# Patient Record
Sex: FEMALE | Race: WHITE | Employment: FULL TIME | URBAN - METROPOLITAN AREA
[De-identification: names, ages, dates, MRNs, and addresses within clinical notes are randomized per-mention and may not be internally consistent; named-entity substitution may affect disease eponyms.]

---

## 2016-08-12 LAB
HBV SURFACE AG SERPL QL IA: NORMAL
RUBELLA ANTIBODY IGG QUANTITATIVE: NORMAL IU/ML

## 2017-01-24 ENCOUNTER — HOSPITAL ENCOUNTER (OUTPATIENT)
Facility: CLINIC | Age: 32
End: 2017-01-24
Attending: OBSTETRICS & GYNECOLOGY | Admitting: OBSTETRICS & GYNECOLOGY
Payer: COMMERCIAL

## 2017-02-01 LAB — GROUP B STREP PCR: NORMAL

## 2017-03-07 ENCOUNTER — HOSPITAL ENCOUNTER (OUTPATIENT)
Facility: CLINIC | Age: 32
Discharge: HOME | End: 2017-03-07
Attending: OBSTETRICS & GYNECOLOGY | Admitting: OBSTETRICS & GYNECOLOGY
Payer: COMMERCIAL

## 2017-03-07 ENCOUNTER — ANESTHESIA (OUTPATIENT)
Dept: OBGYN | Facility: CLINIC | Age: 32
End: 2017-03-07
Payer: COMMERCIAL

## 2017-03-07 ENCOUNTER — ANESTHESIA EVENT (OUTPATIENT)
Dept: OBGYN | Facility: CLINIC | Age: 32
End: 2017-03-07
Payer: COMMERCIAL

## 2017-03-07 ENCOUNTER — HOSPITAL ENCOUNTER (INPATIENT)
Facility: CLINIC | Age: 32
LOS: 3 days | Discharge: HOME HEALTH | End: 2017-03-10
Attending: OBSTETRICS & GYNECOLOGY | Admitting: OBSTETRICS & GYNECOLOGY
Payer: COMMERCIAL

## 2017-03-07 VITALS
TEMPERATURE: 100 F | RESPIRATION RATE: 18 BRPM | DIASTOLIC BLOOD PRESSURE: 77 MMHG | BODY MASS INDEX: 25.83 KG/M2 | HEIGHT: 65 IN | SYSTOLIC BLOOD PRESSURE: 127 MMHG | WEIGHT: 155 LBS

## 2017-03-07 LAB
ABO + RH BLD: NORMAL
ABO + RH BLD: NORMAL
BASOPHILS # BLD AUTO: 0 10E9/L (ref 0–0.2)
BASOPHILS NFR BLD AUTO: 0.3 %
CRYSTALS AMN MICRO: NORMAL
DIFFERENTIAL METHOD BLD: ABNORMAL
EOSINOPHIL # BLD AUTO: 0 10E9/L (ref 0–0.7)
EOSINOPHIL NFR BLD AUTO: 0.3 %
ERYTHROCYTE [DISTWIDTH] IN BLOOD BY AUTOMATED COUNT: 13.3 % (ref 10–15)
HCT VFR BLD AUTO: 38.1 % (ref 35–47)
HGB BLD-MCNC: 13.2 G/DL (ref 11.7–15.7)
IMM GRANULOCYTES # BLD: 0.1 10E9/L (ref 0–0.4)
IMM GRANULOCYTES NFR BLD: 0.6 %
LYMPHOCYTES # BLD AUTO: 2.1 10E9/L (ref 0.8–5.3)
LYMPHOCYTES NFR BLD AUTO: 15 %
MCH RBC QN AUTO: 31.6 PG (ref 26.5–33)
MCHC RBC AUTO-ENTMCNC: 34.6 G/DL (ref 31.5–36.5)
MCV RBC AUTO: 91 FL (ref 78–100)
MONOCYTES # BLD AUTO: 1 10E9/L (ref 0–1.3)
MONOCYTES NFR BLD AUTO: 6.8 %
NEUTROPHILS # BLD AUTO: 10.9 10E9/L (ref 1.6–8.3)
NEUTROPHILS NFR BLD AUTO: 77 %
NRBC # BLD AUTO: 0 10*3/UL
NRBC BLD AUTO-RTO: 0 /100
PLATELET # BLD AUTO: 211 10E9/L (ref 150–450)
RBC # BLD AUTO: 4.18 10E12/L (ref 3.8–5.2)
SPECIMEN EXP DATE BLD: NORMAL
WBC # BLD AUTO: 14.1 10E9/L (ref 4–11)

## 2017-03-07 PROCEDURE — 99214 OFFICE O/P EST MOD 30 MIN: CPT | Mod: 25

## 2017-03-07 PROCEDURE — 36415 COLL VENOUS BLD VENIPUNCTURE: CPT | Performed by: OBSTETRICS & GYNECOLOGY

## 2017-03-07 PROCEDURE — 25000125 ZZHC RX 250: Performed by: OBSTETRICS & GYNECOLOGY

## 2017-03-07 PROCEDURE — 25000125 ZZHC RX 250: Performed by: ANESTHESIOLOGY

## 2017-03-07 PROCEDURE — 99213 OFFICE O/P EST LOW 20 MIN: CPT | Mod: 25

## 2017-03-07 PROCEDURE — 86780 TREPONEMA PALLIDUM: CPT | Performed by: OBSTETRICS & GYNECOLOGY

## 2017-03-07 PROCEDURE — 25000128 H RX IP 250 OP 636: Performed by: ANESTHESIOLOGY

## 2017-03-07 PROCEDURE — 12000029 ZZH R&B OB INTERMEDIATE

## 2017-03-07 PROCEDURE — 86900 BLOOD TYPING SEROLOGIC ABO: CPT | Performed by: OBSTETRICS & GYNECOLOGY

## 2017-03-07 PROCEDURE — 85025 COMPLETE CBC W/AUTO DIFF WBC: CPT | Performed by: OBSTETRICS & GYNECOLOGY

## 2017-03-07 PROCEDURE — 25800025 ZZH RX 258: Performed by: OBSTETRICS & GYNECOLOGY

## 2017-03-07 PROCEDURE — 3E0R3CZ INTRODUCTION OF REGIONAL ANESTHETIC INTO SPINAL CANAL, PERCUTANEOUS APPROACH: ICD-10-PCS | Performed by: ANESTHESIOLOGY

## 2017-03-07 PROCEDURE — 00HU33Z INSERTION OF INFUSION DEVICE INTO SPINAL CANAL, PERCUTANEOUS APPROACH: ICD-10-PCS | Performed by: ANESTHESIOLOGY

## 2017-03-07 PROCEDURE — 59025 FETAL NON-STRESS TEST: CPT

## 2017-03-07 PROCEDURE — 25000128 H RX IP 250 OP 636: Performed by: OBSTETRICS & GYNECOLOGY

## 2017-03-07 PROCEDURE — 37000011 ZZH ANESTHESIA WARD SERVICE

## 2017-03-07 PROCEDURE — 86901 BLOOD TYPING SEROLOGIC RH(D): CPT | Performed by: OBSTETRICS & GYNECOLOGY

## 2017-03-07 PROCEDURE — 10907ZC DRAINAGE OF AMNIOTIC FLUID, THERAPEUTIC FROM PRODUCTS OF CONCEPTION, VIA NATURAL OR ARTIFICIAL OPENING: ICD-10-PCS | Performed by: OBSTETRICS & GYNECOLOGY

## 2017-03-07 RX ORDER — CARBOPROST TROMETHAMINE 250 UG/ML
250 INJECTION, SOLUTION INTRAMUSCULAR
Status: DISCONTINUED | OUTPATIENT
Start: 2017-03-07 | End: 2017-03-08

## 2017-03-07 RX ORDER — OXYTOCIN/0.9 % SODIUM CHLORIDE 30/500 ML
100-340 PLASTIC BAG, INJECTION (ML) INTRAVENOUS CONTINUOUS PRN
Status: DISCONTINUED | OUTPATIENT
Start: 2017-03-07 | End: 2017-03-08

## 2017-03-07 RX ORDER — EPHEDRINE SULFATE 50 MG/ML
5 INJECTION, SOLUTION INTRAMUSCULAR; INTRAVENOUS; SUBCUTANEOUS
Status: DISCONTINUED | OUTPATIENT
Start: 2017-03-07 | End: 2017-03-08

## 2017-03-07 RX ORDER — IBUPROFEN 800 MG/1
800 TABLET, FILM COATED ORAL
Status: COMPLETED | OUTPATIENT
Start: 2017-03-07 | End: 2017-03-08

## 2017-03-07 RX ORDER — NALOXONE HYDROCHLORIDE 0.4 MG/ML
.1-.4 INJECTION, SOLUTION INTRAMUSCULAR; INTRAVENOUS; SUBCUTANEOUS
Status: DISCONTINUED | OUTPATIENT
Start: 2017-03-07 | End: 2017-03-08

## 2017-03-07 RX ORDER — SODIUM CHLORIDE, SODIUM LACTATE, POTASSIUM CHLORIDE, CALCIUM CHLORIDE 600; 310; 30; 20 MG/100ML; MG/100ML; MG/100ML; MG/100ML
INJECTION, SOLUTION INTRAVENOUS CONTINUOUS
Status: DISCONTINUED | OUTPATIENT
Start: 2017-03-07 | End: 2017-03-08

## 2017-03-07 RX ORDER — OXYTOCIN/0.9 % SODIUM CHLORIDE 30/500 ML
1-24 PLASTIC BAG, INJECTION (ML) INTRAVENOUS CONTINUOUS
Status: DISCONTINUED | OUTPATIENT
Start: 2017-03-07 | End: 2017-03-08

## 2017-03-07 RX ORDER — ACETAMINOPHEN 325 MG/1
650 TABLET ORAL EVERY 4 HOURS PRN
Status: DISCONTINUED | OUTPATIENT
Start: 2017-03-07 | End: 2017-03-08

## 2017-03-07 RX ORDER — LIDOCAINE HYDROCHLORIDE AND EPINEPHRINE 15; 5 MG/ML; UG/ML
3 INJECTION, SOLUTION EPIDURAL
Status: COMPLETED | OUTPATIENT
Start: 2017-03-07 | End: 2017-03-07

## 2017-03-07 RX ORDER — NALBUPHINE HYDROCHLORIDE 10 MG/ML
2.5-5 INJECTION, SOLUTION INTRAMUSCULAR; INTRAVENOUS; SUBCUTANEOUS EVERY 6 HOURS PRN
Status: DISCONTINUED | OUTPATIENT
Start: 2017-03-07 | End: 2017-03-08

## 2017-03-07 RX ORDER — ROPIVACAINE HYDROCHLORIDE 2 MG/ML
10 INJECTION, SOLUTION EPIDURAL; INFILTRATION; PERINEURAL ONCE
Status: COMPLETED | OUTPATIENT
Start: 2017-03-07 | End: 2017-03-07

## 2017-03-07 RX ORDER — ONDANSETRON 2 MG/ML
4 INJECTION INTRAMUSCULAR; INTRAVENOUS EVERY 6 HOURS PRN
Status: DISCONTINUED | OUTPATIENT
Start: 2017-03-07 | End: 2017-03-08

## 2017-03-07 RX ORDER — FENTANYL CITRATE 50 UG/ML
50-100 INJECTION, SOLUTION INTRAMUSCULAR; INTRAVENOUS
Status: DISCONTINUED | OUTPATIENT
Start: 2017-03-07 | End: 2017-03-08

## 2017-03-07 RX ORDER — OXYCODONE AND ACETAMINOPHEN 5; 325 MG/1; MG/1
1 TABLET ORAL
Status: DISCONTINUED | OUTPATIENT
Start: 2017-03-07 | End: 2017-03-08

## 2017-03-07 RX ORDER — LIDOCAINE 40 MG/G
CREAM TOPICAL
Status: DISCONTINUED | OUTPATIENT
Start: 2017-03-07 | End: 2017-03-08

## 2017-03-07 RX ORDER — OXYTOCIN 10 [USP'U]/ML
10 INJECTION, SOLUTION INTRAMUSCULAR; INTRAVENOUS
Status: DISCONTINUED | OUTPATIENT
Start: 2017-03-07 | End: 2017-03-08

## 2017-03-07 RX ORDER — METHYLERGONOVINE MALEATE 0.2 MG/ML
200 INJECTION INTRAVENOUS
Status: DISCONTINUED | OUTPATIENT
Start: 2017-03-07 | End: 2017-03-08

## 2017-03-07 RX ADMIN — Medication 1 MILLI-UNITS/MIN: at 20:57

## 2017-03-07 RX ADMIN — Medication 12 ML/HR: at 20:14

## 2017-03-07 RX ADMIN — SODIUM CHLORIDE, POTASSIUM CHLORIDE, SODIUM LACTATE AND CALCIUM CHLORIDE: 600; 310; 30; 20 INJECTION, SOLUTION INTRAVENOUS at 20:14

## 2017-03-07 RX ADMIN — ONDANSETRON 4 MG: 2 INJECTION INTRAMUSCULAR; INTRAVENOUS at 23:09

## 2017-03-07 RX ADMIN — SODIUM CHLORIDE, POTASSIUM CHLORIDE, SODIUM LACTATE AND CALCIUM CHLORIDE 1000 ML: 600; 310; 30; 20 INJECTION, SOLUTION INTRAVENOUS at 19:22

## 2017-03-07 RX ADMIN — ROPIVACAINE HYDROCHLORIDE 15 ML: 2 INJECTION, SOLUTION EPIDURAL; INFILTRATION at 20:08

## 2017-03-07 RX ADMIN — LIDOCAINE HYDROCHLORIDE AND EPINEPHRINE 3 ML: 15; 5 INJECTION, SOLUTION EPIDURAL at 20:08

## 2017-03-07 RX ADMIN — SODIUM CHLORIDE, POTASSIUM CHLORIDE, SODIUM LACTATE AND CALCIUM CHLORIDE 500 ML: 600; 310; 30; 20 INJECTION, SOLUTION INTRAVENOUS at 17:24

## 2017-03-07 NOTE — IP AVS SNAPSHOT
MRN:[id number]                      After Visit Summary   3/7/2017    Meli Pringle    MRN: [id number]           Thank you!     Thank you for choosing [facility] for your care. Our goal is always to provide you with excellent care. Hearing back from our patients is one way we can continue to improve our services. Please take a few minutes to complete the written survey that you may receive in the mail after you visit with us. Thank you!        Patient Information     Date Of Birth          1985        About your hospital stay     You were admitted on:  March 7, 2017 You last received care in the:  [facility]    You were discharged on:  March 7, 2017       Who to Call     For medical emergencies, please call 911.  For non-urgent questions about your medical care, please call your primary care provider or clinic, [phone]          Attending Provider     Provider Carlene Nelson MD OB/Gyn       Primary Care Provider Office Phone # Fax #    Carlene Morrison [phone]       [facility] OBGYN CONSULT [address] [place] [id number]        Further instructions from your care team       Discharge Instruction for Undelivered Patients      You were seen for: Labor Assessment  We Consulted: Dr. CONSUELO Morrison  You had (Test or Medicine):fetal monitoring, fern test     Diet:   Drink 8 to 12 glasses of liquids (milk, juice, water) every day.  You may eat meals and snacks.     Activity:  Call your doctor or nurse midwife if your baby is moving less than usual.     Call your provider if you notice:  Swelling in your face or increased swelling in your hands or legs.  Headaches that are not relieved by Tylenol (acetaminophen).  Changes in your vision (blurring: seeing spots or stars.)  Nausea (sick to your stomach) and vomiting (throwing up).   Weight gain of 5 pounds or more per week.  Heartburn that doesn't go away.  Signs  "of bladder infection: pain when you urinate (use the toilet), need to go more often and more urgently.  The bag of leon (rupture of membranes) breaks, or you notice leaking in your underwear.  Bright red blood in your underwear.  Abdominal (lower belly) or stomach pain.  For first baby: Contractions (tightening) less than 5 minutes apart for one hour or more.  Second (plus) baby: Contractions (tightening) less than 10 minutes apart and getting stronger.  *If less than 34 weeks: Contractions (tightenings) more than 6 times in one hour.  Increase or change in vaginal discharge (note the color and amount)  Other: See MD at clinic today.    Follow-up:  As scheduled in the clinic          Pending Results     No orders found from 3/5/2017 to 3/8/2017.            Admission Information     Date & Time Provider Department Dept. Phone    3/7/2017 Carlene Morrison MD [facility] [phone]      Your Vitals Were     Blood Pressure Temperature Respirations Height Weight Last Period    127/77 100  F (37.8  C) (Temporal) 18 1.651 m (5' 5\") 70.3 kg (155 lb) 2016    BMI (Body Mass Index)                   25.79 kg/m2           [employer] Information     [employer] lets you send messages to your doctor, view your test results, renew your prescriptions, schedule appointments and more. To sign up, go to [email or web address] . Click on \"Log in\" on the left side of the screen, which will take you to the Welcome page. Then click on \"Sign up Now\" on the right side of the page.     You will be asked to enter the access code listed below, as well as some personal information. Please follow the directions to create your username and password.     Your access code is: [email or web address]  Expires: 2017  3:06 AM     Your access code will  in 90 days. If you need help or a new code, please call your [facility] clinic or [phone].        Care EveryWhere ID     This is your Care EveryWhere ID. This could be " used by other organizations to access your [facility] medical records  [id number]           Review of your medicines      UNREVIEWED medicines. Ask your doctor about these medicines        Dose / Directions    DHA PO        Dose:  1 tablet   Take 1 tablet by mouth daily   Refills:  0       prenatal multivitamin  plus iron 27-0.8 MG Tabs per tablet        Dose:  1 tablet   Take 1 tablet by mouth daily   Refills:  0       VITAMIN D (CHOLECALCIFEROL) PO        Dose:  1000 Units   Take 1,000 Units by mouth daily   Refills:  0       ZYRTEC 10 MG tablet   Generic drug:  cetirizine        Dose:  10 mg   Take 1 tablet by mouth daily.   Quantity:  30 tablet   Refills:  5                Protect others around you: Learn how to safely use, store and throw away your medicines at www.disposemymeds.org.             Medication List: This is a list of all your medications and when to take them. Check marks below indicate your daily home schedule. Keep this list as a reference.      Medications           Morning Afternoon Evening Bedtime As Needed    DHA PO   Take 1 tablet by mouth daily                                prenatal multivitamin  plus iron 27-0.8 MG Tabs per tablet   Take 1 tablet by mouth daily                                VITAMIN D (CHOLECALCIFEROL) PO   Take 1,000 Units by mouth daily                                ZYRTEC 10 MG tablet   Take 1 tablet by mouth daily.   Generic drug:  cetirizine

## 2017-03-07 NOTE — IP AVS SNAPSHOT
MRN:[id number]                      After Visit Summary   3/7/2017    Meli Pringle    MRN: [id number]           Thank you!     Thank you for choosing [facility] for your care. Our goal is always to provide you with excellent care. Hearing back from our patients is one way we can continue to improve our services. Please take a few minutes to complete the written survey that you may receive in the mail after you visit with us. Thank you!        Patient Information     Date Of Birth          1985        About your hospital stay     You were admitted on:  March 7, 2017 You last received care in the:  [address]    You were discharged on:  March 10, 2017       Who to Call     For medical emergencies, please call 911.  For non-urgent questions about your medical care, please call your primary care provider or clinic, [phone]          Attending Provider     Provider Specialty    Carlene Morrison MD OB/Gyn    [place], Viki Horton MD OB/Gyn       Primary Care Provider Office Phone # Fax #    Carlene Morrison [phone]       [facility] OBGYN CONSULT [address] [address]        After Care Instructions     Activity       Review discharge instructions            Diet       Resume previous diet            Discharge Instructions - Postpartum visit       Schedule postpartum visit with your provider and return to clinic in 6 weeks.                  Further instructions from your care team       Postpartum Vaginal Delivery Instructions    Activity       Ask family and friends for help when you need it.    Do not place anything in your vagina for 6 weeks.    You are not restricted on other activities, but take it easy for a few weeks to allow your body to recover from delivery.  You are able to do any activities you feel up to that point.    No driving until you have stopped taking your pain medications  (usually two weeks after delivery).     Call your health care provider if you have any of these symptoms:       Increased pain, swelling, redness, or fluid around your stiches from an episiotomy or perineal tear.    A fever above 100.4 F (38 C) with or without chills when placing a thermometer under your tongue.    You soak a sanitary pad with blood within 1 hour, or you see blood clots larger than a golf ball.    Bleeding that lasts more than 6 weeks.    Vaginal discharge that smells bad.    Severe pain, cramping or tenderness in your lower belly area.    A need to urinate more frequently (use the toilet more often), more urgently (use the toilet very quickly), or it burns when you urinate.    Nausea and vomiting.    Redness, swelling or pain around a vein in your leg.    Problems breastfeeding or a red or painful area on your breast.    Chest pain and cough or are gasping for air.    Problems coping with sadness, anxiety, or depression.  If you have any concerns about hurting yourself or the baby, call your provider immediately.     You have questions or concerns after you return home.     Keep your hands clean:  Always wash your hands before touching your perineal area and stitches.  This helps reduce your risk of infection.  If your hands aren't dirty, you may use an alcohol hand-rub to clean your hands. Keep your nails clean and short.        Pending Results     No orders found from 3/5/2017 to 3/8/2017.            Statement of Approval     Ordered          03/10/17 0842  I have reviewed and agree with all the recommendations and orders detailed in this document.  EFFECTIVE NOW     Approved and electronically signed by:  Jaxson Granados MD             Admission Information     Date & Time Provider Department Dept. Phone    3/7/2017 Viki Newman MD [address] [phone]      Your Vitals Were     Blood Pressure Pulse Temperature Respirations Height Weight    114/79  "91 98.6  F (37  C) 16 1.651 m (5' 5\") 69.4 kg (153 lb)    Last Period Pulse Oximetry BMI (Body Mass Index)             2016 98% 25.46 kg/m2         [employer] Information     [employer] lets you send messages to your doctor, view your test results, renew your prescriptions, schedule appointments and more. To sign up, go to [email or web address] . Click on \"Log in\" on the left side of the screen, which will take you to the Welcome page. Then click on \"Sign up Now\" on the right side of the page.     You will be asked to enter the access code listed below, as well as some personal information. Please follow the directions to create your username and password.     Your access code is: [email or web address]  Expires: 2017  3:06 AM     Your access code will  in 90 days. If you need help or a new code, please call your [facility] clinic or [phone].        Care EveryWhere ID     This is your Care EveryWhere ID. This could be used by other organizations to access your [facility] medical records  [id number]           Review of your medicines      CONTINUE these medicines which have NOT CHANGED        Dose / Directions    DHA PO        Dose:  1 tablet   Take 1 tablet by mouth daily   Refills:  0       prenatal multivitamin  plus iron 27-0.8 MG Tabs per tablet        Dose:  1 tablet   Take 1 tablet by mouth daily   Refills:  0       TYLENOL PO        Dose:  1000 mg   Take 1,000 mg by mouth every 6 hours as needed for mild pain or fever   Refills:  0       VITAMIN D (CHOLECALCIFEROL) PO        Dose:  1000 Units   Take 1,000 Units by mouth daily   Refills:  0       ZANTAC PO        Dose:  75 mg   Take 75 mg by mouth 2 times daily   Refills:  0       ZYRTEC 10 MG tablet   Generic drug:  cetirizine        Dose:  10 mg   Take 1 tablet by mouth daily.   Quantity:  30 tablet   Refills:  5                Protect others around you: Learn how to safely use, store and throw away your medicines at www.disposemymeds.org.           "   Medication List: This is a list of all your medications and when to take them. Check marks below indicate your daily home schedule. Keep this list as a reference.      Medications           Morning Afternoon Evening Bedtime As Needed    DHA PO   Take 1 tablet by mouth daily                                prenatal multivitamin  plus iron 27-0.8 MG Tabs per tablet   Take 1 tablet by mouth daily                                TYLENOL PO   Take 1,000 mg by mouth every 6 hours as needed for mild pain or fever   Last time this was given:  650 mg on 3/10/2017  4:04 AM                                VITAMIN D (CHOLECALCIFEROL) PO   Take 1,000 Units by mouth daily                                ZANTAC PO   Take 75 mg by mouth 2 times daily                                ZYRTEC 10 MG tablet   Take 1 tablet by mouth daily.   Generic drug:  cetirizine

## 2017-03-07 NOTE — IP AVS SNAPSHOT
[address]    [place] [address]    Phone:  [phone]                                       After Visit Summary   3/7/2017    Meli Pringle    MRN: [id number]           After Visit Summary Signature Page     I have received my discharge instructions, and my questions have been answered. I have discussed any challenges I see with this plan with the nurse or doctor.    ..........................................................................................................................................  Patient/Patient Representative Signature      ..........................................................................................................................................  Patient Representative Print Name and Relationship to Patient    ..................................................               ................................................  Date                                            Time    ..........................................................................................................................................  Reviewed by Signature/Title    ...................................................              ..............................................  Date                                                            Time

## 2017-03-07 NOTE — PLAN OF CARE
40+4 week primip admitted from clinic in labor. Pt states she had her cervix checked in office by Dr Morrison today and cervix was 5-6 cm. Pt denies leaking vag fluid and states UC's were very strong between 1516-4003 but have spaced out and aren't as strong now. EUM/US applied with pts consent. Will monitor pt and update  on pts progress.

## 2017-03-07 NOTE — DISCHARGE INSTRUCTIONS
Discharge Instruction for Undelivered Patients      You were seen for: Labor Assessment  We Consulted: Dr. CONSUELO Morrison  You had (Test or Medicine):fetal monitoring, fern test     Diet:   Drink 8 to 12 glasses of liquids (milk, juice, water) every day.  You may eat meals and snacks.     Activity:  Call your doctor or nurse midwife if your baby is moving less than usual.     Call your provider if you notice:  Swelling in your face or increased swelling in your hands or legs.  Headaches that are not relieved by Tylenol (acetaminophen).  Changes in your vision (blurring: seeing spots or stars.)  Nausea (sick to your stomach) and vomiting (throwing up).   Weight gain of 5 pounds or more per week.  Heartburn that doesn't go away.  Signs of bladder infection: pain when you urinate (use the toilet), need to go more often and more urgently.  The bag of leon (rupture of membranes) breaks, or you notice leaking in your underwear.  Bright red blood in your underwear.  Abdominal (lower belly) or stomach pain.  For first baby: Contractions (tightening) less than 5 minutes apart for one hour or more.  Second (plus) baby: Contractions (tightening) less than 10 minutes apart and getting stronger.  *If less than 34 weeks: Contractions (tightenings) more than 6 times in one hour.  Increase or change in vaginal discharge (note the color and amount)  Other: See MD at clinic today.    Follow-up:  As scheduled in the clinic

## 2017-03-07 NOTE — H&P
"OB Brief Admit H&P    No significant change in general health status based on examination of the patient, review of Nursing Admission Database and prenatal record.    Pt is a 31 year old  @ 40w4d who presented to L&D with labor. Pt was here last night with prodromal labor. Sent home due to no cervical change at 0400 hours. Ctx continued during the day but never closer than 6 min, more often 15 min apart. Pt went to her regular appt at the office today and cx was 6 cm. Sent to L&D. Pt has not slept, tired, wants to advance labor if possible    Patient's prenatal course has been complicated by hx triploid fetus 2016 with TOP. NIPT normal this pregnancy. Elevated GCT with normal GTT    Prenatal Labs:    Blood type O pos  Rubella imm  , 3 hr GTT normal  GBS neg    EFW: 6.5-7    Ht 1.651 m (5' 5\")  Wt 69.4 kg (153 lb)  LMP 2016  BMI 25.46 kg/m2  EFM:  Moderate variability with accels, no decels. 's -170's  [place]: ctx q 6-10 min  SVE: 6/90%/-1, cx very stretchy  Membranes:  After consent, AROM with clear fluid    Assessment:  31 year old  @ 40w4d admitted for labor. Prodromal, now slow progress to 6 cm with inadequate labor.     Plan:  1. Admit to labor and delivery   2. Observe for 1 hr after AROM. If labor not improved will start pitocin  3. Epidural prn. Pt prefers no nitrous  4. Anticipate  later rosario Newman  3/7/2017  5:27 PM      "

## 2017-03-07 NOTE — IP AVS SNAPSHOT
[address], [address]    [facility] [address]    Phone:  [phone]                                       After Visit Summary   3/7/2017    Meli Pringle    MRN: [id number]           After Visit Summary Signature Page     I have received my discharge instructions, and my questions have been answered. I have discussed any challenges I see with this plan with the nurse or doctor.    ..........................................................................................................................................  Patient/Patient Representative Signature      ..........................................................................................................................................  Patient Representative Print Name and Relationship to Patient    ..................................................               ................................................  Date                                            Time    ..........................................................................................................................................  Reviewed by Signature/Title    ...................................................              ..............................................  Date                                                            Time

## 2017-03-07 NOTE — PLAN OF CARE
0300- no cervical change. Contractions spaced out , will discharge. Monitors off  0310- discharge instruction reviewed, no questions, will Keep appointment Dr. Morrison today.

## 2017-03-07 NOTE — PLAN OF CARE
0050-  here at 40 4/7 weeks with complaint of contractions, small amount of vaginal bleeding and Diarrhea since  when contractions started.  Denies other problems with pregnancy, negative for GBS. Monitors applied with consent. Temp-100.2-temporal. pulse 125. /88 which is elevated for her. Her prenatal shows -120/60-70's.  0115- noted some wetness to chux under patient. Patient denies rupture. Will do amnisure.  0125- attempted amnisure- swab came out totally blood soked so unable to send. Samantha obtained and sent, SVE 2+, 75% and -1 station. Blood show on glove, Patient  States she did have intercourse tonight to try and get into labor.

## 2017-03-07 NOTE — PLAN OF CARE
0215- sarika nixon, Dr. Morrison in house.  In to see patient. Discussed plan of care. Will re-check cervix at 0300, if no change will discharge home to await better labor and keep next schedule clinic visit this week.

## 2017-03-08 LAB — T PALLIDUM IGG+IGM SER QL: NEGATIVE

## 2017-03-08 PROCEDURE — 0KQM0ZZ REPAIR PERINEUM MUSCLE, OPEN APPROACH: ICD-10-PCS | Performed by: OBSTETRICS & GYNECOLOGY

## 2017-03-08 PROCEDURE — 72200001 ZZH LABOR CARE VAGINAL DELIVERY SINGLE

## 2017-03-08 PROCEDURE — 12000037 ZZH R&B POSTPARTUM INTERMEDIATE

## 2017-03-08 PROCEDURE — 25000132 ZZH RX MED GY IP 250 OP 250 PS 637: Performed by: OBSTETRICS & GYNECOLOGY

## 2017-03-08 PROCEDURE — 25000125 ZZHC RX 250: Performed by: OBSTETRICS & GYNECOLOGY

## 2017-03-08 RX ORDER — MISOPROSTOL 200 UG/1
400 TABLET ORAL
Status: DISCONTINUED | OUTPATIENT
Start: 2017-03-08 | End: 2017-03-10 | Stop reason: HOSPADM

## 2017-03-08 RX ORDER — OXYTOCIN 10 [USP'U]/ML
10 INJECTION, SOLUTION INTRAMUSCULAR; INTRAVENOUS
Status: DISCONTINUED | OUTPATIENT
Start: 2017-03-08 | End: 2017-03-10 | Stop reason: HOSPADM

## 2017-03-08 RX ORDER — OXYCODONE HYDROCHLORIDE 5 MG/1
5-10 TABLET ORAL
Status: DISCONTINUED | OUTPATIENT
Start: 2017-03-08 | End: 2017-03-10 | Stop reason: HOSPADM

## 2017-03-08 RX ORDER — HYDROCORTISONE 2.5 %
CREAM (GRAM) TOPICAL 3 TIMES DAILY PRN
Status: DISCONTINUED | OUTPATIENT
Start: 2017-03-08 | End: 2017-03-10 | Stop reason: HOSPADM

## 2017-03-08 RX ORDER — OXYTOCIN/0.9 % SODIUM CHLORIDE 30/500 ML
340 PLASTIC BAG, INJECTION (ML) INTRAVENOUS CONTINUOUS PRN
Status: DISCONTINUED | OUTPATIENT
Start: 2017-03-08 | End: 2017-03-10 | Stop reason: HOSPADM

## 2017-03-08 RX ORDER — BISACODYL 10 MG
10 SUPPOSITORY, RECTAL RECTAL DAILY PRN
Status: DISCONTINUED | OUTPATIENT
Start: 2017-03-10 | End: 2017-03-10 | Stop reason: HOSPADM

## 2017-03-08 RX ORDER — LANOLIN 100 %
OINTMENT (GRAM) TOPICAL
Status: DISCONTINUED | OUTPATIENT
Start: 2017-03-08 | End: 2017-03-10 | Stop reason: HOSPADM

## 2017-03-08 RX ORDER — ACETAMINOPHEN 325 MG/1
650 TABLET ORAL EVERY 4 HOURS PRN
Status: DISCONTINUED | OUTPATIENT
Start: 2017-03-08 | End: 2017-03-10 | Stop reason: HOSPADM

## 2017-03-08 RX ORDER — OXYTOCIN/0.9 % SODIUM CHLORIDE 30/500 ML
100 PLASTIC BAG, INJECTION (ML) INTRAVENOUS CONTINUOUS
Status: DISCONTINUED | OUTPATIENT
Start: 2017-03-08 | End: 2017-03-10 | Stop reason: HOSPADM

## 2017-03-08 RX ORDER — IBUPROFEN 400 MG/1
400-800 TABLET, FILM COATED ORAL EVERY 6 HOURS PRN
Status: DISCONTINUED | OUTPATIENT
Start: 2017-03-08 | End: 2017-03-10 | Stop reason: HOSPADM

## 2017-03-08 RX ORDER — NALOXONE HYDROCHLORIDE 0.4 MG/ML
.1-.4 INJECTION, SOLUTION INTRAMUSCULAR; INTRAVENOUS; SUBCUTANEOUS
Status: DISCONTINUED | OUTPATIENT
Start: 2017-03-08 | End: 2017-03-10 | Stop reason: HOSPADM

## 2017-03-08 RX ORDER — AMOXICILLIN 250 MG
1-2 CAPSULE ORAL 2 TIMES DAILY
Status: DISCONTINUED | OUTPATIENT
Start: 2017-03-08 | End: 2017-03-10 | Stop reason: HOSPADM

## 2017-03-08 RX ADMIN — IBUPROFEN 800 MG: 800 TABLET ORAL at 01:55

## 2017-03-08 RX ADMIN — IBUPROFEN 800 MG: 400 TABLET ORAL at 15:14

## 2017-03-08 RX ADMIN — ACETAMINOPHEN 650 MG: 325 TABLET, FILM COATED ORAL at 17:10

## 2017-03-08 RX ADMIN — IBUPROFEN 800 MG: 400 TABLET ORAL at 21:02

## 2017-03-08 RX ADMIN — ACETAMINOPHEN 650 MG: 325 TABLET, FILM COATED ORAL at 06:53

## 2017-03-08 RX ADMIN — SENNOSIDES AND DOCUSATE SODIUM 1 TABLET: 8.6; 5 TABLET ORAL at 08:55

## 2017-03-08 RX ADMIN — Medication 100 ML/HR: at 01:45

## 2017-03-08 RX ADMIN — ACETAMINOPHEN 650 MG: 325 TABLET, FILM COATED ORAL at 21:01

## 2017-03-08 RX ADMIN — SENNOSIDES AND DOCUSATE SODIUM 1 TABLET: 8.6; 5 TABLET ORAL at 21:02

## 2017-03-08 RX ADMIN — ACETAMINOPHEN 650 MG: 325 TABLET, FILM COATED ORAL at 01:55

## 2017-03-08 RX ADMIN — IBUPROFEN 800 MG: 400 TABLET ORAL at 08:55

## 2017-03-08 RX ADMIN — ACETAMINOPHEN 650 MG: 325 TABLET, FILM COATED ORAL at 12:22

## 2017-03-08 RX ADMIN — Medication 340 ML/HR: at 01:14

## 2017-03-08 NOTE — PLAN OF CARE
Dr. Newman at bedside, reviewed strip. Give one 500 mL LR bolus for fetal tachycardia. SVE 6/90/-1. AROM for clear fluid. Okay for intermittent monitoring per protocol. Will continue to monitor.

## 2017-03-08 NOTE — PLAN OF CARE
Data: Meli Pringle transferred to [facility] via wheelchair at 0245. Baby transferred via parent's arms.  Action: Receiving unit notified of transfer: Yes. Patient and family notified of room change. Report given to Tracie LANDAVERDE RN at 0250. Belongings sent to receiving unit. Accompanied by Registered Nurse. Oriented patient to surroundings. Call light within reach. ID bands double-checked with receiving RN.  Response: Patient tolerated transfer and is stable.

## 2017-03-08 NOTE — ANESTHESIA PREPROCEDURE EVALUATION
Anesthesia Evaluation       history and physical reviewed .      No history of anesthetic complications     ROS/MED HX    ENT/Pulmonary:     (+)asthma , . .    Neurologic:       Cardiovascular:         METS/Exercise Tolerance:     Hematologic:         Musculoskeletal:         GI/Hepatic:         Renal/Genitourinary:         Endo:         Psychiatric:         Infectious Disease:         Malignancy:         Other:                              Anesthesia Plan      History & Physical Review      ASA Status:  .  OB Epidural Asa: 2            Postoperative Care      Consents  Anesthetic plan, risks, benefits and alternatives discussed with:  Patient..                          .

## 2017-03-08 NOTE — PLAN OF CARE
Problem: Goal Outcome Summary  Goal: Goal Outcome Summary  Outcome: No Change  Up to floor at 0300 oriented to room /unit resources folder given infant safety reviewed questions answered pain controlled with tylenol and motrin  Up ambulated to bathroom tolerated well working on breast feeding  encourage to call with needs continue to monitor

## 2017-03-08 NOTE — PLAN OF CARE
Problem: Labor (Cervical Ripen, Induct, Augment) (Adult,Obstetrics,Pediatric)  Goal: Signs and Symptoms of Listed Potential Problems Will be Absent or Manageable (Labor)  Signs and symptoms of listed potential problems will be absent or manageable by discharge/transition of care (reference Labor (Cervical Ripen, Induct, Augment) (Adult,Obstetrics,Pediatric) CPG).   Outcome: Completed Date Met:  03/08/17  Delivery of viable female infant over lacerated perineum. Continuous RN presence during second stage of labor. Apgars 8 & 9. See delivery summary for further details.

## 2017-03-08 NOTE — PLAN OF CARE
Problem: Goal Outcome Summary  Goal: Goal Outcome Summary  Outcome: Improving  Contractions started becoming more intense around 1845. patient requests epidural at 1915. Report given to Yanira Tello RN at bedside.

## 2017-03-08 NOTE — ANESTHESIA PROCEDURE NOTES
Peripheral nerve/Neuraxial procedure note : epidural catheter  Pre-Procedure  Performed by RASHID FABIAN  Location: OB      Pre-Anesthestic Checklist: patient identified, IV checked, risks and benefits discussed, informed consent and monitors and equipment checked    Timeout  Correct Patient: Yes   Correct Procedure: Yes   Correct Site: Yes   Correct Laterality: N/A   Correct Position: Yes   Site Marked: N/A   .   Procedure Documentation    .    Procedure:    Epidural catheter.  Insertion Site:L3-4  (midline approach) Injection technique: LORT saline   Local skin infiltrated with 3 mL of 1% lidocaine.  RIC at 5 cm     Patient Prep;povidone-iodine 7.5% surgical scrub.  .  Needle: Touhy needle (17 G. 3.5 in). # of attempts: 1. # of redirects:.  Spinal Needle: . . . . .  Catheter threaded easily  5 cm epidural space.  10 cm at skin.   .    Assessment/Narrative  Paresthesias: No.  .  .  Aspiration negative for heme or CSF  . Test dose of 3 mL lidocaine 1.5% w/ 1:200,000 epinephrine at. Test dose negative for signs of intravascular, subdural or intrathecal injection. Comments:  Labor Epidural  No complications.

## 2017-03-08 NOTE — PLAN OF CARE
Problem: Goal Outcome Summary  Goal: Goal Outcome Summary  Outcome: No Change  Vital signs stable. Voiding without difficulties. Bleeding wnl. Up ad ari. Using ice and tucks. Taking tylenol and ibuprofen for pain control. Showered. Breast feeding fairly well when infant awake. Encouraged to call with any questions or concerns. Will continue to monitor.

## 2017-03-08 NOTE — PLAN OF CARE
Problem: Goal Outcome Summary  Goal: Goal Outcome Summary  Outcome: Improving  1915 - Report received to assume patient care. In room to assess. Plan of care reviewed. Patient agrees. Patient states contractions are getting much more painful. Pain control options discussed.  1930 - SVE. Cx 6/90/0. Patient requesting epidural. IV fluid bolus started.  1950 - Dr. Linder at bedside for epidural. Procedure explained per [facility]. Consent signed. Patient positioned sitting for procedure.  2000 - Epidural placed without difficulty per [facility]. Unable to assess FHR and contractions during procedure due to maternal position. Patient repositioned to left tilt. Monitors adjusted. . Serial BPs started.  2030 - Patient resting comfortably with epidural  2050 - 16F Drew placed without difficulty. SVE. Cx 6.5. Minimal change noted. Discussed starting Pitocin per policy for augmentation. Patient agrees.  2057 - Pitocin started. Patient repositioned to left lateral. Will continue to monitor.

## 2017-03-08 NOTE — L&D DELIVERY NOTE
HISTORY:  Meli Pringle is a 31-year-old female who is a  2, para 0 at 40-5/7 weeks' gestation who presented to Labor and Delivery yesterday in labor.  She was here the night before    in prodromal labor and was discharged home on the morning of 2017 with her cervix still 2 cm dilated.  Contractions continued throughout the day, but were never closer than every 6 minutes apart and sometimes 15 minutes apart.  Meli presented to her regular obstetrical appointment at the office today and was found to be 6 cm dilated.  She was sent to Labor and Delivery at approximately 1530 hrs.  Meli expressed the desire to advance her labor if possible.  Prenatal course was complicated    by a history of a triploidy fetus 1 year ago with termination of pregnancy.  Maternal genome testing during this pregnancy was normal.  She had an elevated 1-hour GCT with a normal 3-hour GTT.     She is Group B Strep negative.  Estimated fetal weight is 6-1/2 to 7 pounds.        FIRST STAGE:  On admission Meli's contractions were of moderate variability with accelerations, no decelerations and a Category 1.  Contractions were every 6-10 minutes apart, and her cervix    was 6 cm, 90%, vertex at -1 station and very stretchy.  After verbal consent was obtained from Meli an amniotomy was performed at 1715 hrs.  Contractions improved somewhat, but were still fairly spaced out.  However, she did become more uncomfortable and received an epidural for analgesia.  Cervix had not changed at approximately 2045 hrs, and therefore Pitocin augmentation was begun to a maximum    of 1 milliunit per minute.  With just 1 milliunit of Pitocin her contractions increased in frequency to every 1-1/2 to 4 minutes and became much stronger, and she progressed to a rim at 2200 hours.      SECOND STAGE:  Meli progressed to complete at 2300 hrs.  Fetal heart tones remained    a Category 1.  She labored down until 2320 hrs and was set up  to begin pushing.  She made excellent pushing efforts and gradually brought the vertex to a full crown.  At 0011 on 2017 Meli spontaneously delivered a healthy female infant from the occiput anterior position.  Mouth and nares were suctioned on the perineum, and there was a snug nuchal cord which could not be reduced    over the head.  The cord was doubly clamped and cut on the perineum.  The remainder of the baby was then delivered without difficulty.  Apgars were 8/9 at 1/5 minutes.        THIRD STAGE:  The placenta delivered spontaneously at 0014 hrs and was intact with 3-cord vessels.  IV Pitocin was continued following delivery of the placenta.  Cervix and vagina were inspected, and there were no lacerations.  There was a midline second-degree tear of the posterior perineum with right and left [place] in the form of a Y above the hymen.  Each fork of the Y was closed with a running locked suture to the midline posterior fourchette and joined.  Deep sutures were placed in the perineal body    in an interrupted fashion.  The remainder of the laceration was repaired in the usual fashion with subcutaneous 3-0 Vicryl followed by subcuticular 3-0 Vicryl.  Quantitative blood loss was only approximately 100 cc.  Both mother and baby were doing well following delivery.  All sponge and needle counts were correct following delivery and repair of the laceration.         ARELY CHAMPION MD             D: 2017 01:11   T: 2017 10:26   MT: [id number]      Name:     MELI BERNAL   MRN:      [id number]        Account:        [id number]   :      1985           Delivery Date:  2017      Document: [id number]       cc: Carlene Champion MD

## 2017-03-08 NOTE — PROGRESS NOTES
Pt evaluated. Just received epidural. Had been very uncomfortable. Ctx q 3-6 min. FHR reassuring. Cx before epidural 6/90/0 per RN. Pitocin not yet started.     Once pt comfortable if ctx not increased in frequency plan pitocin augmentation. Pt agreeable to plan

## 2017-03-09 LAB — HGB BLD-MCNC: 11.9 G/DL (ref 11.7–15.7)

## 2017-03-09 PROCEDURE — 36415 COLL VENOUS BLD VENIPUNCTURE: CPT | Performed by: OBSTETRICS & GYNECOLOGY

## 2017-03-09 PROCEDURE — 85018 HEMOGLOBIN: CPT | Performed by: OBSTETRICS & GYNECOLOGY

## 2017-03-09 PROCEDURE — 12000037 ZZH R&B POSTPARTUM INTERMEDIATE

## 2017-03-09 PROCEDURE — 25000132 ZZH RX MED GY IP 250 OP 250 PS 637: Performed by: OBSTETRICS & GYNECOLOGY

## 2017-03-09 RX ADMIN — ACETAMINOPHEN 650 MG: 325 TABLET, FILM COATED ORAL at 09:57

## 2017-03-09 RX ADMIN — SENNOSIDES AND DOCUSATE SODIUM 1 TABLET: 8.6; 5 TABLET ORAL at 08:42

## 2017-03-09 RX ADMIN — ACETAMINOPHEN 650 MG: 325 TABLET, FILM COATED ORAL at 22:55

## 2017-03-09 RX ADMIN — IBUPROFEN 800 MG: 400 TABLET ORAL at 04:35

## 2017-03-09 RX ADMIN — IBUPROFEN 800 MG: 400 TABLET ORAL at 22:55

## 2017-03-09 RX ADMIN — ACETAMINOPHEN 650 MG: 325 TABLET, FILM COATED ORAL at 00:35

## 2017-03-09 RX ADMIN — IBUPROFEN 800 MG: 400 TABLET ORAL at 09:57

## 2017-03-09 RX ADMIN — SENNOSIDES AND DOCUSATE SODIUM 1 TABLET: 8.6; 5 TABLET ORAL at 20:05

## 2017-03-09 RX ADMIN — ACETAMINOPHEN 650 MG: 325 TABLET, FILM COATED ORAL at 16:54

## 2017-03-09 RX ADMIN — IBUPROFEN 800 MG: 400 TABLET ORAL at 16:55

## 2017-03-09 RX ADMIN — ACETAMINOPHEN 650 MG: 325 TABLET, FILM COATED ORAL at 04:35

## 2017-03-09 NOTE — PROGRESS NOTES
"OB Post-partum Note  PPD# 1    S:  Patient doing well.  Pain controlled.  Voiding.  Bleeding is normal.  Breast feeding.    O:  /60  Pulse 91  Temp 97.8  F (36.6  C) (Oral)  Resp 16  Ht 1.651 m (5' 5\")  Wt 69.4 kg (153 lb)  LMP 2016  SpO2 98%  Breastfeeding  Gen- A&O, NAD  Abd- Non-tender, fundus firm at umbilicus  Perineum intact, healing well  Ext- non-tender, no edema, no leg or calf pain    Hemoglobin   Date Value Ref Range Status   2017 13.2 11.7 - 15.7 g/dL Final     O pos  Rubella Immune    A/P: 31 year old  PPD# 1 s/p     1.  Routine post-partum cares  2.  Analgesia  3.  Discharge tomorrow  4.  The plan of care was discussed with the patient.  She expressed understanding and agreement        Shonda Benavidez  3/9/2017  8:58 AM  "

## 2017-03-09 NOTE — ANESTHESIA POSTPROCEDURE EVALUATION
Patient: Meli Pringle    * No procedures listed *    Diagnosis:* No pre-op diagnosis entered *  Diagnosis Additional Information: No value filed.    Anesthesia Type:  No value filed.    Note:  Anesthesia Post Evaluation    Patient location during evaluation: floor (Postpartum Unit)  Patient participation: Able to fully participate in evaluation  Level of consciousness: awake and alert  Pain management: adequate  Airway patency: patent  Cardiovascular status: acceptable  Respiratory status: acceptable  Hydration status: acceptable  PONV: none     Anesthetic complications: None    Comments: Pt doing well.  Denies epidural-related complaints.          Last vitals:  Vitals:    03/08/17 0651 03/08/17 0855 03/08/17 1606   BP: 125/81 121/86 130/82   Pulse:   91   Resp: 18 16 16   Temp: 36.5  C (97.7  F) 36.5  C (97.7  F) 36.5  C (97.7  F)   SpO2: 98%           Electronically Signed By: Hu Lang MD  March 8, 2017  8:41 PM

## 2017-03-09 NOTE — LACTATION NOTE
Patient request to see to check latch.  Baby latched well- assisted with positioning.  Latched well on both sides. Reassurance given that things are going well.  Will follow as needed.

## 2017-03-09 NOTE — PLAN OF CARE
Problem: Goal Outcome Summary  Goal: Goal Outcome Summary  Outcome: Improving  VSS, pain well controlled with Tylenol and Ibuprofen, using ice and tucks.  Working on breastfeeding, needs some assist getting baby to latch. Ambulating and voiding without difficulty.

## 2017-03-09 NOTE — PLAN OF CARE
Problem: Goal Outcome Summary  Goal: Goal Outcome Summary  Outcome: No Change  A&O. VSS. Mild pain with tylenol and ibuprofen. Up independently.  at bedside.

## 2017-03-10 VITALS
HEIGHT: 65 IN | WEIGHT: 153 LBS | RESPIRATION RATE: 16 BRPM | HEART RATE: 91 BPM | TEMPERATURE: 98.6 F | DIASTOLIC BLOOD PRESSURE: 79 MMHG | OXYGEN SATURATION: 98 % | SYSTOLIC BLOOD PRESSURE: 114 MMHG | BODY MASS INDEX: 25.49 KG/M2

## 2017-03-10 PROCEDURE — 25000132 ZZH RX MED GY IP 250 OP 250 PS 637: Performed by: OBSTETRICS & GYNECOLOGY

## 2017-03-10 RX ADMIN — IBUPROFEN 800 MG: 400 TABLET ORAL at 04:04

## 2017-03-10 RX ADMIN — IBUPROFEN 800 MG: 400 TABLET ORAL at 11:36

## 2017-03-10 RX ADMIN — ACETAMINOPHEN 650 MG: 325 TABLET, FILM COATED ORAL at 11:36

## 2017-03-10 RX ADMIN — ACETAMINOPHEN 650 MG: 325 TABLET, FILM COATED ORAL at 04:04

## 2017-03-10 RX ADMIN — SENNOSIDES AND DOCUSATE SODIUM 1 TABLET: 8.6; 5 TABLET ORAL at 08:24

## 2017-03-10 NOTE — PLAN OF CARE
Problem: Goal Outcome Summary  Goal: Goal Outcome Summary  Outcome: Adequate for Discharge Date Met:  03/10/17  D: VSS, assessments WDL.   I: Pt. received complete discharge paperwork and home medications as filled by discharge pharmacy-[facility]. Patient already has a breast pump at home.  Pt. was given times of last dose for all discharge medications in writing on discharge medication sheets.  Discharge teaching included home medication, pain management, activity restrictions, postpartum cares, and signs and symptoms of infection.    A: Discharge outcomes on care plan met.  Mother states understanding and comfort with self and baby cares.  P: Pt. discharged to home.  Pt. was discharged with baby, and bands were checked at time of discharge.  Pt. was accompanied by , nurse and baby, and left with personal belongings.  Home care sent.  Pt. to follow up with OB per MD order.  Pt. had no further questions at the time of discharge and no unmet needs were identified.

## 2017-03-10 NOTE — PLAN OF CARE
Problem: Goal Outcome Summary  Goal: Goal Outcome Summary  Outcome: No Change  Vital signs stable. Up ad ari. Voiding without difficulties. Bleeding wnl. Taking tylenol and ibuprofen for pain control. Breast feeding well. Encouraged to call with any questions or concerns. Will continue to monitor.

## 2017-03-10 NOTE — PROGRESS NOTES
"OB Post-partum Note  PPD# 2    S:  Patient doing well.  Pain controlled.  Voiding.  Bleeding is normal.  Breast feeding.    O:  /60  Pulse 91  Temp 97.8  F (36.6  C) (Oral)  Resp 16  Ht 1.651 m (5' 5\")  Wt 69.4 kg (153 lb)  LMP 2016  SpO2 98%  Breastfeeding  Gen- A&O, NAD  Abd- Non-tender, fundus firm at umbilicus  Perineum intact, healing well  Ext- non-tender, no edema, no leg or calf pain    Hemoglobin   Date Value Ref Range Status   2017 11.9 11.7 - 15.7 g/dL Final     O pos  Rubella Immune    A/P: 31 year old  PPD# 2 s/p     1.  Routine post-partum cares  2.  Analgesia  3.  Discharge today  4.  The plan of care was discussed with the patient.  She expressed understanding and agreement        Jaxson Granados DO  March 10, 2017  [phone]     "

## 2017-03-10 NOTE — PLAN OF CARE
Problem: Goal Outcome Summary  Goal: Goal Outcome Summary  Outcome: Improving  VSS. Pain managed with Tylenol and Ibuprofen. Independent with infant cares and breastfeeding every 3 hours and going well. Encouraged to call with any questions, needs or concerns. Will continue to monitor.

## 2017-03-29 ENCOUNTER — TELEPHONE (OUTPATIENT)
Dept: PEDIATRICS | Facility: CLINIC | Age: 32
End: 2017-03-29

## 2017-03-29 DIAGNOSIS — N64.4 NIPPLE PAIN: Primary | ICD-10-CM

## 2017-03-29 RX ORDER — MUPIROCIN 20 MG/G
OINTMENT TOPICAL
Qty: 22 G | Refills: 1 | Status: ON HOLD | OUTPATIENT
Start: 2017-03-29 | End: 2019-07-28

## 2017-07-29 ENCOUNTER — HEALTH MAINTENANCE LETTER (OUTPATIENT)
Age: 32
End: 2017-07-29

## 2019-01-08 ENCOUNTER — TRANSFERRED RECORDS (OUTPATIENT)
Dept: HEALTH INFORMATION MANAGEMENT | Facility: CLINIC | Age: 34
End: 2019-01-08

## 2019-01-18 LAB
HBV SURFACE AG SERPL QL IA: NEGATIVE
HIV 1+2 AB+HIV1 P24 AG SERPL QL IA: NEGATIVE
RUBELLA ABY IGG: NORMAL

## 2019-04-16 LAB — TREPONEMA ANTIBODIES: NONREACTIVE

## 2019-07-09 LAB — GROUP B STREP PCR: NEGATIVE

## 2019-07-28 ENCOUNTER — ANESTHESIA EVENT (OUTPATIENT)
Dept: OBGYN | Facility: CLINIC | Age: 34
End: 2019-07-28
Payer: COMMERCIAL

## 2019-07-28 ENCOUNTER — ANESTHESIA (OUTPATIENT)
Dept: OBGYN | Facility: CLINIC | Age: 34
End: 2019-07-28
Payer: COMMERCIAL

## 2019-07-28 ENCOUNTER — HOSPITAL ENCOUNTER (INPATIENT)
Facility: CLINIC | Age: 34
LOS: 2 days | Discharge: HOME | End: 2019-07-30
Attending: OBSTETRICS & GYNECOLOGY | Admitting: OBSTETRICS & GYNECOLOGY
Payer: COMMERCIAL

## 2019-07-28 LAB
ABO + RH BLD: NORMAL
ABO + RH BLD: NORMAL
BLD GP AB SCN SERPL QL: NORMAL
BLOOD BANK CMNT PATIENT-IMP: NORMAL
SPECIMEN EXP DATE BLD: NORMAL
T PALLIDUM AB SER QL: NONREACTIVE

## 2019-07-28 PROCEDURE — 72200001 ZZH LABOR CARE VAGINAL DELIVERY SINGLE

## 2019-07-28 PROCEDURE — 25800030 ZZH RX IP 258 OP 636: Performed by: OBSTETRICS & GYNECOLOGY

## 2019-07-28 PROCEDURE — 59025 FETAL NON-STRESS TEST: CPT

## 2019-07-28 PROCEDURE — 86850 RBC ANTIBODY SCREEN: CPT | Performed by: OBSTETRICS & GYNECOLOGY

## 2019-07-28 PROCEDURE — 25000128 H RX IP 250 OP 636: Performed by: ANESTHESIOLOGY

## 2019-07-28 PROCEDURE — 12000035 ZZH R&B POSTPARTUM

## 2019-07-28 PROCEDURE — 0KQM0ZZ REPAIR PERINEUM MUSCLE, OPEN APPROACH: ICD-10-PCS | Performed by: OBSTETRICS & GYNECOLOGY

## 2019-07-28 PROCEDURE — 3E0R3BZ INTRODUCTION OF ANESTHETIC AGENT INTO SPINAL CANAL, PERCUTANEOUS APPROACH: ICD-10-PCS | Performed by: ANESTHESIOLOGY

## 2019-07-28 PROCEDURE — 36415 COLL VENOUS BLD VENIPUNCTURE: CPT | Performed by: OBSTETRICS & GYNECOLOGY

## 2019-07-28 PROCEDURE — 00HU33Z INSERTION OF INFUSION DEVICE INTO SPINAL CANAL, PERCUTANEOUS APPROACH: ICD-10-PCS | Performed by: ANESTHESIOLOGY

## 2019-07-28 PROCEDURE — 37000011 ZZH ANESTHESIA WARD SERVICE

## 2019-07-28 PROCEDURE — 86901 BLOOD TYPING SEROLOGIC RH(D): CPT | Performed by: OBSTETRICS & GYNECOLOGY

## 2019-07-28 PROCEDURE — G0463 HOSPITAL OUTPT CLINIC VISIT: HCPCS | Mod: 25

## 2019-07-28 PROCEDURE — 25000128 H RX IP 250 OP 636: Performed by: OBSTETRICS & GYNECOLOGY

## 2019-07-28 PROCEDURE — 86900 BLOOD TYPING SEROLOGIC ABO: CPT | Performed by: OBSTETRICS & GYNECOLOGY

## 2019-07-28 PROCEDURE — 25000132 ZZH RX MED GY IP 250 OP 250 PS 637: Performed by: OBSTETRICS & GYNECOLOGY

## 2019-07-28 PROCEDURE — 86780 TREPONEMA PALLIDUM: CPT | Performed by: OBSTETRICS & GYNECOLOGY

## 2019-07-28 PROCEDURE — 27110038 ZZH RX 271: Performed by: ANESTHESIOLOGY

## 2019-07-28 RX ORDER — FENTANYL CITRATE 50 UG/ML
25 INJECTION, SOLUTION INTRAMUSCULAR; INTRAVENOUS ONCE
Status: DISCONTINUED | OUTPATIENT
Start: 2019-07-28 | End: 2019-07-28

## 2019-07-28 RX ORDER — NALOXONE HYDROCHLORIDE 0.4 MG/ML
.1-.4 INJECTION, SOLUTION INTRAMUSCULAR; INTRAVENOUS; SUBCUTANEOUS
Status: DISCONTINUED | OUTPATIENT
Start: 2019-07-28 | End: 2019-07-30 | Stop reason: HOSPADM

## 2019-07-28 RX ORDER — OXYTOCIN/0.9 % SODIUM CHLORIDE 30/500 ML
100-340 PLASTIC BAG, INJECTION (ML) INTRAVENOUS CONTINUOUS PRN
Status: COMPLETED | OUTPATIENT
Start: 2019-07-28 | End: 2019-07-28

## 2019-07-28 RX ORDER — OXYTOCIN/0.9 % SODIUM CHLORIDE 30/500 ML
340 PLASTIC BAG, INJECTION (ML) INTRAVENOUS CONTINUOUS PRN
Status: DISCONTINUED | OUTPATIENT
Start: 2019-07-28 | End: 2019-07-30 | Stop reason: HOSPADM

## 2019-07-28 RX ORDER — OXYTOCIN/0.9 % SODIUM CHLORIDE 30/500 ML
1-24 PLASTIC BAG, INJECTION (ML) INTRAVENOUS CONTINUOUS
Status: DISCONTINUED | OUTPATIENT
Start: 2019-07-28 | End: 2019-07-28

## 2019-07-28 RX ORDER — BISACODYL 10 MG
10 SUPPOSITORY, RECTAL RECTAL DAILY PRN
Status: DISCONTINUED | OUTPATIENT
Start: 2019-07-30 | End: 2019-07-30 | Stop reason: HOSPADM

## 2019-07-28 RX ORDER — IBUPROFEN 400 MG/1
800 TABLET, FILM COATED ORAL EVERY 6 HOURS PRN
Status: DISCONTINUED | OUTPATIENT
Start: 2019-07-28 | End: 2019-07-30 | Stop reason: HOSPADM

## 2019-07-28 RX ORDER — BUPIVACAINE HYDROCHLORIDE 2.5 MG/ML
2 INJECTION, SOLUTION EPIDURAL; INFILTRATION; INTRACAUDAL ONCE
Status: DISCONTINUED | OUTPATIENT
Start: 2019-07-28 | End: 2019-07-28

## 2019-07-28 RX ORDER — IBUPROFEN 400 MG/1
800 TABLET, FILM COATED ORAL
Status: DISCONTINUED | OUTPATIENT
Start: 2019-07-28 | End: 2019-07-28

## 2019-07-28 RX ORDER — EPHEDRINE SULFATE 50 MG/ML
5 INJECTION, SOLUTION INTRAMUSCULAR; INTRAVENOUS; SUBCUTANEOUS
Status: DISCONTINUED | OUTPATIENT
Start: 2019-07-28 | End: 2019-07-28

## 2019-07-28 RX ORDER — LANOLIN 100 %
OINTMENT (GRAM) TOPICAL
Status: DISCONTINUED | OUTPATIENT
Start: 2019-07-28 | End: 2019-07-30 | Stop reason: HOSPADM

## 2019-07-28 RX ORDER — METHYLERGONOVINE MALEATE 0.2 MG/ML
200 INJECTION INTRAVENOUS
Status: DISCONTINUED | OUTPATIENT
Start: 2019-07-28 | End: 2019-07-28

## 2019-07-28 RX ORDER — CALCIUM CARBONATE 500 MG/1
1000 TABLET, CHEWABLE ORAL EVERY 4 HOURS PRN
Status: DISCONTINUED | OUTPATIENT
Start: 2019-07-28 | End: 2019-07-28

## 2019-07-28 RX ORDER — NALOXONE HYDROCHLORIDE 0.4 MG/ML
.1-.4 INJECTION, SOLUTION INTRAMUSCULAR; INTRAVENOUS; SUBCUTANEOUS
Status: DISCONTINUED | OUTPATIENT
Start: 2019-07-28 | End: 2019-07-28

## 2019-07-28 RX ORDER — ACETAMINOPHEN 325 MG/1
650 TABLET ORAL EVERY 4 HOURS PRN
Status: DISCONTINUED | OUTPATIENT
Start: 2019-07-28 | End: 2019-07-28

## 2019-07-28 RX ORDER — AMOXICILLIN 250 MG
2 CAPSULE ORAL 2 TIMES DAILY
Status: DISCONTINUED | OUTPATIENT
Start: 2019-07-28 | End: 2019-07-30 | Stop reason: HOSPADM

## 2019-07-28 RX ORDER — NALBUPHINE HYDROCHLORIDE 10 MG/ML
2.5-5 INJECTION, SOLUTION INTRAMUSCULAR; INTRAVENOUS; SUBCUTANEOUS EVERY 6 HOURS PRN
Status: DISCONTINUED | OUTPATIENT
Start: 2019-07-28 | End: 2019-07-28

## 2019-07-28 RX ORDER — CARBOPROST TROMETHAMINE 250 UG/ML
250 INJECTION, SOLUTION INTRAMUSCULAR
Status: DISCONTINUED | OUTPATIENT
Start: 2019-07-28 | End: 2019-07-28

## 2019-07-28 RX ORDER — ONDANSETRON 2 MG/ML
4 INJECTION INTRAMUSCULAR; INTRAVENOUS EVERY 6 HOURS PRN
Status: DISCONTINUED | OUTPATIENT
Start: 2019-07-28 | End: 2019-07-28

## 2019-07-28 RX ORDER — ACETAMINOPHEN 325 MG/1
650 TABLET ORAL EVERY 4 HOURS PRN
Status: DISCONTINUED | OUTPATIENT
Start: 2019-07-28 | End: 2019-07-30 | Stop reason: HOSPADM

## 2019-07-28 RX ORDER — SACCHAROMYCES BOULARDII 250 MG
250 CAPSULE ORAL DAILY
COMMUNITY

## 2019-07-28 RX ORDER — SODIUM CHLORIDE, SODIUM LACTATE, POTASSIUM CHLORIDE, CALCIUM CHLORIDE 600; 310; 30; 20 MG/100ML; MG/100ML; MG/100ML; MG/100ML
INJECTION, SOLUTION INTRAVENOUS CONTINUOUS
Status: DISCONTINUED | OUTPATIENT
Start: 2019-07-28 | End: 2019-07-28

## 2019-07-28 RX ORDER — HYDROCORTISONE 2.5 %
CREAM (GRAM) TOPICAL 3 TIMES DAILY PRN
Status: DISCONTINUED | OUTPATIENT
Start: 2019-07-28 | End: 2019-07-30 | Stop reason: HOSPADM

## 2019-07-28 RX ORDER — OXYTOCIN/0.9 % SODIUM CHLORIDE 30/500 ML
100 PLASTIC BAG, INJECTION (ML) INTRAVENOUS CONTINUOUS
Status: DISCONTINUED | OUTPATIENT
Start: 2019-07-28 | End: 2019-07-30 | Stop reason: HOSPADM

## 2019-07-28 RX ORDER — ROPIVACAINE HYDROCHLORIDE 2 MG/ML
10 INJECTION, SOLUTION EPIDURAL; INFILTRATION; PERINEURAL ONCE
Status: COMPLETED | OUTPATIENT
Start: 2019-07-28 | End: 2019-07-28

## 2019-07-28 RX ORDER — LIDOCAINE 40 MG/G
CREAM TOPICAL
Status: DISCONTINUED | OUTPATIENT
Start: 2019-07-28 | End: 2019-07-28

## 2019-07-28 RX ORDER — AMOXICILLIN 250 MG
1 CAPSULE ORAL 2 TIMES DAILY
Status: DISCONTINUED | OUTPATIENT
Start: 2019-07-28 | End: 2019-07-30 | Stop reason: HOSPADM

## 2019-07-28 RX ORDER — OXYTOCIN 10 [USP'U]/ML
10 INJECTION, SOLUTION INTRAMUSCULAR; INTRAVENOUS
Status: DISCONTINUED | OUTPATIENT
Start: 2019-07-28 | End: 2019-07-30 | Stop reason: HOSPADM

## 2019-07-28 RX ORDER — OXYCODONE AND ACETAMINOPHEN 5; 325 MG/1; MG/1
1 TABLET ORAL
Status: DISCONTINUED | OUTPATIENT
Start: 2019-07-28 | End: 2019-07-28

## 2019-07-28 RX ORDER — OXYTOCIN 10 [USP'U]/ML
10 INJECTION, SOLUTION INTRAMUSCULAR; INTRAVENOUS
Status: DISCONTINUED | OUTPATIENT
Start: 2019-07-28 | End: 2019-07-28

## 2019-07-28 RX ADMIN — SODIUM CHLORIDE, POTASSIUM CHLORIDE, SODIUM LACTATE AND CALCIUM CHLORIDE: 600; 310; 30; 20 INJECTION, SOLUTION INTRAVENOUS at 08:42

## 2019-07-28 RX ADMIN — ROPIVACAINE HYDROCHLORIDE 5 ML: 2 INJECTION, SOLUTION EPIDURAL; INFILTRATION at 08:49

## 2019-07-28 RX ADMIN — CALCIUM CARBONATE (ANTACID) CHEW TAB 500 MG 1000 MG: 500 CHEW TAB at 09:56

## 2019-07-28 RX ADMIN — ROPIVACAINE HYDROCHLORIDE 5 ML: 2 INJECTION, SOLUTION EPIDURAL; INFILTRATION at 08:46

## 2019-07-28 RX ADMIN — ACETAMINOPHEN 650 MG: 325 TABLET, FILM COATED ORAL at 19:27

## 2019-07-28 RX ADMIN — ACETAMINOPHEN 650 MG: 325 TABLET, FILM COATED ORAL at 13:33

## 2019-07-28 RX ADMIN — Medication 12 ML/HR: at 08:57

## 2019-07-28 RX ADMIN — OXYTOCIN 2 MILLI-UNITS/MIN: 10 INJECTION INTRAVENOUS at 09:54

## 2019-07-28 RX ADMIN — IBUPROFEN 800 MG: 400 TABLET ORAL at 19:26

## 2019-07-28 RX ADMIN — SODIUM CHLORIDE, POTASSIUM CHLORIDE, SODIUM LACTATE AND CALCIUM CHLORIDE 1000 ML: 600; 310; 30; 20 INJECTION, SOLUTION INTRAVENOUS at 07:55

## 2019-07-28 RX ADMIN — IBUPROFEN 800 MG: 400 TABLET ORAL at 13:33

## 2019-07-28 RX ADMIN — OXYTOCIN 340 ML/HR: 10 INJECTION INTRAVENOUS at 11:08

## 2019-07-28 RX ADMIN — SENNOSIDES AND DOCUSATE SODIUM 1 TABLET: 8.6; 5 TABLET ORAL at 19:27

## 2019-07-28 ASSESSMENT — MIFFLIN-ST. JEOR: SCORE: 1408.96

## 2019-07-28 NOTE — PLAN OF CARE
Data: Meli Pringle transferred to Room 403 via wheelchair at 1400. Baby transferred via patient's arms.  Action: Receiving unit notified of transfer: Yes. Patient and family notified of room change. Report given to JEAN Julian at 1405. Belongings sent to receiving unit. Accompanied by Registered Nurse. Oriented patient to surroundings. Call light within reach. ID bands double-checked with receiving RN.  Response: Patient tolerated transfer and is stable.

## 2019-07-28 NOTE — PLAN OF CARE
Dr. Newman at bedside to perform SVE.  She recommends starting Pitocin augmentation - pt verbally consents.  Pt is comfortable with epidural, notes having heartburn and requests Tums.  Orders entered.

## 2019-07-28 NOTE — PLAN OF CARE
Vital signs stable. Up ad ari. Voiding without difficulties. Bleeding wnl. Saline lock removed. Using ice and tucks. Taking tylenol and ibuprofen for pain control. Breast feeding fair when infant awake, skin to skin when sleepy. Encouraged to call with any questions or concerns. Will continue to monitor.

## 2019-07-28 NOTE — ANESTHESIA PREPROCEDURE EVALUATION
"Anesthesia Pre-Procedure Evaluation    Patient: Meli Pringle   MRN: [id number] : 1985          Preoperative Diagnosis: * No surgery found *        Past Medical History:   Diagnosis Date     Uncomplicated asthma     allergy induced     Past Surgical History:   Procedure Laterality Date     DILATION AND CURETTAGE SUCTION N/A 2016    Procedure: DILATION AND CURETTAGE SUCTION;  Surgeon: Elyse Bean MD;  Location: [facility] OR     wisdom teeth removal                          Lab Results   Component Value Date    WBC 14.1 (H) 2017    HGB 11.9 2017    HCT 38.1 2017     2017       Preop Vitals  BP Readings from Last 3 Encounters:   19 126/77   03/10/17 114/79   17 127/77    Pulse Readings from Last 3 Encounters:   17 91   11 58      Resp Readings from Last 3 Encounters:   19 18   03/10/17 16   17 18    SpO2 Readings from Last 3 Encounters:   17 98%   16 98%      Temp Readings from Last 1 Encounters:   19 37.7  C (99.9  F) (Temporal)    Ht Readings from Last 1 Encounters:   19 1.651 m (5' 5\")      Wt Readings from Last 1 Encounters:   19 70.3 kg (155 lb)    Estimated body mass index is 25.79 kg/m  as calculated from the following:    Height as of this encounter: 1.651 m (5' 5\").    Weight as of this encounter: 70.3 kg (155 lb).       Anesthesia Plan      History & Physical Review      ASA Status:  2 .        Plan for Epidural          Postoperative Care      Consents  Anesthetic plan, risks, benefits and alternatives discussed with:  Patient..                 Jayda Rinaldi  "

## 2019-07-28 NOTE — PLAN OF CARE
Patient presents to [facility] at 40w0d  noting labor contractions that started last evening at 2100 and have become more frequent and intense, about 5 mins apart now.  She is breathing through each ctx and resting comfortably in between.  She notes +FM, small amt vaginal bleeding, denies leaking fluid.  She consents to external fetal & uterine monitoring.  NST obtained.  Cervix 6 cm.  Spoke to Dr. Schwartz on phone to give update on SVE, monitoring, and pt's desire for an epidural.  Orders received to admit pt and proceed with epidural.  Dr. Newman on at 0800.

## 2019-07-28 NOTE — L&D DELIVERY NOTE
Delivery Date:  2019      HISTORY:  The patient is a 34-year-old  3, para 1-0-1-1, at 40-0/7 weeks' gestation, admitted this morning in labor.  Prenatal course was complicated by elevated 1-hour GCT, but a normal 3-hour GTT.  She has a history of a triploidy pregnancy with termination at 13 weeks at a previous pregnancy.  This pregnancy, NIPT testing was normal and an anatomy ultrasound was normal.  She is blood type O positive and group B strep negative.  Estimated fetal weight was 7 pounds.  On admission this morning at 0730 hours, she was 6 cm, 80%, vertex, -2.  Heart tones category 1 and she was admitted.  I assumed care at 0800 hours.  She was requesting epidural at this time.  Epidural was placed and she became comfortable.  Exam showed her cervix to be minimally changed and after verbal consent, amniotomy was performed at approximately 0900 hours with slightly blood-tinged fluid and signs of bloody show.  Contractions remained spaced out to every 5 minutes and cervix did not change significantly and contractions were very brief.  Pitocin augmentation was begun at 0945 hours to a maximum of 4 milliunits per minute.  Contractions became much stronger.  Heart tones remained category 1.  She progressed to complete at 10:52 hours.  She was then set up to begin pushing.  Over the next 2 contractions, she pushed well and at 11:04 hours on 2019, spontaneously delivered a healthy female infant from the OA position.  There was no nuchal cord.  There was a large amount of fluid that delivered with the baby.  This was thin, but  meconium-stained.  It was not  particulate.  The remainder of the baby delivered without difficulty.  There was no shoulder dystocia.  Baby was placed on the maternal abdomen and was immediately crying.  Mouth and nares were suctioned.  After delay of 1 minute, the cord was doubly clamped and cut and the infant remained on the maternal chest.  Apgars 8 at 1 minute, 9 at 5  minutes. Weight is pending at the time of this dictation. There was approximately 1000 mL of amniotic fluid in the Catch bag following delivery of the baby.  The placenta delivered spontaneously at 11:07 hours.  It was intact with 3 cord vessels.  Uterine tone was good and there was minimal bleeding.  Cervix and vagina were inspected.  There was no laceration.  Posterior perineum had a second-degree tear, which was in a Y formation vaginally.  Each fork of the Y was sutured with a running locked suture of 0 Vicryl to the midline and then continued down over the posterior body with deep sutures to the posterior body.  The remainder of the tear was repaired in the usual fashion.  Quantitative blood loss was 43 mL.  Both mother and baby were doing well following delivery.  All sponge and needle counts were correct.         VIKI CHAMPION MD             D: 2019   T: 2019   MT: DUDLEY      Name:     MARCELA BERNAL   MRN:      [id number]        Account:        [id number]   :      1985        Delivery Date:  2019               Document: [id number]       cc: Viki Champion MD

## 2019-07-28 NOTE — PROGRESS NOTES
Pt comfortable with an epidural. EFM cat 1    Cx 7-/-2/BBOW  After verbal consent, AROM this check with bloody show tinged fluid.     Anticipate  later this morning

## 2019-07-28 NOTE — H&P
OB Brief Admit H&P    No significant change in general health status based on examination of the patient, review of Nursing Admission Database and prenatal record.    Pt is a 34 year old  @ 40w0d who presented to L&D with labor.    Patient's prenatal course has been complicated by elevated 1 hr GCT, normal 3 hr. Normal NIPT and anatomy US. Hx triploidy with termination at 13 weeks in a previous pregnancy    Prenatal Labs:    Blood type O pos  Rubella imm  GCT abnl, 3 hour normal  GBS neg    EFW: 7#    /77  EFM:  Moderate variability and accels, no decels  Rosine: q 5 min  SVE: 6/80%/-2 per RN at 0730 hours  Membranes:  intact    Assessment:  34 year old  @ 40w0d admitted for labor    Plan:  1. Admit to labor and delivery   2. Pt requesting epidural  3. AROM after epidural if no signif cx change  4. Anticipate     Viki Newman  2019  8:09 AM

## 2019-07-28 NOTE — ANESTHESIA PROCEDURE NOTES
Peripheral nerve/Neuraxial procedure note : epidural catheter  Pre-Procedure  Performed by Jayda Rinaldi  Location: OB      Pre-Anesthestic Checklist: patient identified, IV checked, risks and benefits discussed, informed consent, monitors and equipment checked and pre-op evaluation    Timeout  Correct Patient: Yes   Correct Procedure: Yes   Correct Site: Yes   Correct Laterality: N/A   Correct Position: Yes   Site Marked: N/A   .   Procedure Documentation    .    Procedure:    Epidural catheter.  Insertion Site:L3-4  (midline approach) Injection technique: LORT saline   Local skin infiltrated with mL of 1% lidocaine.       Patient Prep;mask, sterile gloves, povidone-iodine 7.5% surgical scrub, patient draped.  .  Needle: Touhy needle Needle Gauge: 17.    Needle Length (Inches) 3.5  # of attempts: 2 and # of redirects:  .   . .  Catheter threaded easily  .  10 cm at skin.   .    Assessment/Narrative  Paresthesias: No.  .  .  Aspiration negative for heme or CSF  . Test dose of 3 mL lidocaine 1.5% w/ 1:200,000 epinephrine at. Test dose negative for signs of intravascular, subdural or intrathecal injection. Comments:  No complications, patient tolerated the procedure well.  Sterile dressing placed.

## 2019-07-28 NOTE — PLAN OF CARE
1400--Pt. admitted from L&D  via wheelchair and transferred to bed independently. Pt. arrived with baby and was accompanied by  and arrived with personal belongings. Report was taken from Melina Shipley in L&D. VSstable. Fundus is firm and midline.  Vaginal bleeding is small.  SL in place.  Pt. oriented to the room and call light system.

## 2019-07-29 LAB — HGB BLD-MCNC: 10.7 G/DL (ref 11.7–15.7)

## 2019-07-29 PROCEDURE — 12000035 ZZH R&B POSTPARTUM

## 2019-07-29 PROCEDURE — 36415 COLL VENOUS BLD VENIPUNCTURE: CPT | Performed by: OBSTETRICS & GYNECOLOGY

## 2019-07-29 PROCEDURE — 25000132 ZZH RX MED GY IP 250 OP 250 PS 637: Performed by: OBSTETRICS & GYNECOLOGY

## 2019-07-29 PROCEDURE — 85018 HEMOGLOBIN: CPT | Performed by: OBSTETRICS & GYNECOLOGY

## 2019-07-29 RX ADMIN — IBUPROFEN 800 MG: 400 TABLET ORAL at 01:29

## 2019-07-29 RX ADMIN — ACETAMINOPHEN 650 MG: 325 TABLET, FILM COATED ORAL at 20:33

## 2019-07-29 RX ADMIN — ACETAMINOPHEN 650 MG: 325 TABLET, FILM COATED ORAL at 01:29

## 2019-07-29 RX ADMIN — ACETAMINOPHEN 650 MG: 325 TABLET, FILM COATED ORAL at 07:37

## 2019-07-29 RX ADMIN — ACETAMINOPHEN 650 MG: 325 TABLET, FILM COATED ORAL at 14:10

## 2019-07-29 RX ADMIN — SENNOSIDES AND DOCUSATE SODIUM 1 TABLET: 8.6; 5 TABLET ORAL at 07:37

## 2019-07-29 RX ADMIN — IBUPROFEN 800 MG: 400 TABLET ORAL at 07:37

## 2019-07-29 RX ADMIN — IBUPROFEN 800 MG: 400 TABLET ORAL at 14:11

## 2019-07-29 RX ADMIN — IBUPROFEN 800 MG: 400 TABLET ORAL at 20:33

## 2019-07-29 NOTE — PROGRESS NOTES
"[facility]   Post-Partum Progress Note        Interval History:   Doing well.  Pain is adequately controlled.  No fevers.  No history of foul-smelling vaginal discharge.  Good appetite.  Denies chest pain, shortness of breath, nausea or vomiting.  Vaginal bleeding is similar to a heavy menstrual flow.  Breastfeeding  With nipple shield is going well. Pt is not sure if she wants to go home today.            Medications:   All medications related to the patient's surgery have been reviewed          Physical Exam:   All vitals stable  Blood pressure (P) 111/73, temperature (P) 97.6  F (36.4  C), temperature source (P) Oral, resp. rate (P) 16, height 1.651 m (5' 5\"), weight 70.3 kg (155 lb), SpO2 98 %  Exam Deferred. Pt breastfeeding baby during assessment.           Data:     Hemoglobin   Date Value Ref Range Status   03/09/2017 11.9 11.7 - 15.7 g/dL Final   03/07/2017 13.2 11.7 - 15.7 g/dL Final   02/19/2016 12.9 11.7 - 15.7 g/dL Final            Assessment and Plan:    Assessment:   Post-partum day #1  Normal spontaneous vaginal delivery  :     Doing well.   Plan:   Pt will let us know if she wants to go home today or tomorrow.      Maria Eugenia Crocker"

## 2019-07-29 NOTE — LACTATION NOTE
Initial Lactation visit.  Recommend unlimited, frequent breast feedings: At least 8 - 12 times every 24 hours. Avoid pacifiers and supplementation with formula unless medically indicated. Explained benefits of holding baby skin on skin to help promote better breastfeeding outcomes.   Meli has been having difficulty getting infant to feed well.  She has been latching some on the L but not on the R.  Nipples are smaller and infant attempted to latch during visit but did not stay at breast.  Assessed suck with gloved finger and infant was noted to suck her tongue and not be very coordinated. She would give two sucks then try to push the finger out.  Eventually she started to relax and give 3-4 sucks. Discussed using shield to help infant latch better to breast.  Meli was hesitant but agreed. Transferred to breast with shield.  Infant repeatedly pushed the shield out of her mouth initially then did settle in and start sucking some.  She would stop and push the shield out of her mouth initially when needing to swallow.  Large amount of colostrum in shield.  Infant was able to feed well at breast without coming off to swallow for about 5 minutes.  A few swallows were heard.  Encouraged Meli to use the shield for feeding until infant has fed well 3-4 times without coming off the breast at all and audible swallowing heard.  Reviewed process of weaning from shield as baby starts to feed better.    Explained second night cluster feeding and expectations.   Meli may discharge to home today.  Outpatient lactation resources reviewed for follow up if needing any assistance once home.  Meli had no issues breast feeding her first baby.  She had no other questions and was very appreciative of the assistance today during my visit.  Will revisit as needed.    Saima Foreman RN, IBCLC

## 2019-07-29 NOTE — ANESTHESIA POSTPROCEDURE EVALUATION
Patient: Meli Pringle    * No procedures listed *    Diagnosis:* No pre-op diagnosis entered *  Diagnosis Additional Information: No value filed.    Anesthesia Type:  No value filed.    Note:  Anesthesia Post Evaluation    Patient location during evaluation: Floor and Bedside  Patient participation: Able to fully participate in evaluation  Level of consciousness: awake and alert  Cardiovascular status: acceptable  Respiratory status: acceptable       Comments:           Last vitals:  Vitals:    07/29/19 0135 07/29/19 0737 07/29/19 1539   BP: 104/66 111/73 110/70   Pulse:   91   Resp: 16 16 16   Temp: 36.7  C (98.1  F) 36.4  C (97.6  F) 36.7  C (98  F)   SpO2:            Electronically Signed By: Keven Londono DO  July 29, 2019  4:17 PM

## 2019-07-29 NOTE — PLAN OF CARE
VSS. Voiding without difficulty. Scant vaginal bleeding. Pain controlled with ibuprofen and tylenol. Breastfeeding infant on demand, infant sleepy at breast, skin to skin encouraged. Will continue to monitor.

## 2019-07-29 NOTE — PLAN OF CARE
-VSS, assessment WDL,  -Pain well controlled, requesting PRN pain medications as needed.  Using tucks and Ice packs on 2nd degree laceration.    -up independently in room, voiding in bathroom adequately.   -Working on breastfeeding  and  cares.  Using lanolin on nipples.   -Continue to monitor and notify MD as needed.

## 2019-07-29 NOTE — PLAN OF CARE
Data: Vital signs within normal limits. Postpartum checks within normal limits - see flow record. Patient eating and drinking normally. Patient able to empty bladder independently and is up ambulating. No apparent signs of infection. laceratio  healing well. Patient performing self cares and is able to care for infant.  Action: Patient medicated during the shift for cramping and back pain and perineal pain . See MAR. Patient reassessed within 1 hour after each medication and pain was improved - patient stated she was comfortable and able to sleep. Patient education done about breastfeeding positions and pain medication. See flow record.  Response: Positive attachment behaviors observed with infant. Support person present.   Plan: Anticipate discharge on 7/30.

## 2019-07-30 VITALS
BODY MASS INDEX: 25.83 KG/M2 | HEART RATE: 85 BPM | DIASTOLIC BLOOD PRESSURE: 85 MMHG | TEMPERATURE: 98 F | WEIGHT: 155 LBS | OXYGEN SATURATION: 98 % | RESPIRATION RATE: 16 BRPM | HEIGHT: 65 IN | SYSTOLIC BLOOD PRESSURE: 118 MMHG

## 2019-07-30 PROCEDURE — 25000132 ZZH RX MED GY IP 250 OP 250 PS 637: Performed by: OBSTETRICS & GYNECOLOGY

## 2019-07-30 RX ADMIN — IBUPROFEN 800 MG: 400 TABLET ORAL at 11:13

## 2019-07-30 RX ADMIN — ACETAMINOPHEN 650 MG: 325 TABLET, FILM COATED ORAL at 11:13

## 2019-07-30 RX ADMIN — ACETAMINOPHEN 650 MG: 325 TABLET, FILM COATED ORAL at 05:21

## 2019-07-30 RX ADMIN — SENNOSIDES AND DOCUSATE SODIUM 1 TABLET: 8.6; 5 TABLET ORAL at 08:54

## 2019-07-30 RX ADMIN — IBUPROFEN 800 MG: 400 TABLET ORAL at 05:21

## 2019-07-30 NOTE — PROGRESS NOTES
"[facility]   Post-Partum Progress Note        Interval History:   Doing well.  Pain is adequately controlled.  No fevers.  No history of foul-smelling vaginal discharge.  Good appetite.  Denies chest pain, shortness of breath, nausea or vomiting.  Vaginal bleeding is similar to a heavy menstrual flow.  Breastfeeding well.           Medications:   All medications related to the patient's surgery have been reviewed          Physical Exam:   All vitals stable  Blood pressure 105/72, pulse 85, temperature 97.9  F (36.6  C), temperature source Axillary, resp. rate 16, height 1.651 m (5' 5\"), weight 70.3 kg (155 lb), SpO2 98 %  Uterine fundus is firm, non-tender and at the level of the umbilicus  Perineal sutures intact and wound healing well          Data:     Hgb 10.7         Assessment and Plan:    Assessment:   Post-partum day #2  Normal spontaneous vaginal delivery  :     Doing well.  No excessive bleeding  Pain well-controlled.   Plan:   Ambulation encouraged  Pain control measures as needed  Discharge later today  RTC in 6 weeks, sooner prn     Viki Newman  "

## 2019-07-30 NOTE — LACTATION NOTE
"Follow up visit.  Infant is still struggling with feeding at breast. Meli was overwhelmed and tearful when LC entered room. She said \"I don't want to cry but if she doesn't breast feed I won't pump so she won't get breast milk.\"  Discussed overnight feedings.  Frantic overnight and at 9% weight loss.  Has been using the shield.  Infant attempting to feed at time of visit.  Very fussy, would latch to shield but suck mainly with her lips and not well.  No suction seen and very few swallows heard.  Visible milk in shield.  Infant keeps tongue to roof of mouth.  Worked on having infant suck pacifier and then go to breast and she still was not transferring any milk.  Infant was very frustrated and crying.  Meli did very well positioning and working with baby at breast.  After trying to feed for 30 minutes Meli pumped.   She was able to get 13 ml.  Bottle fed baby supplement of ebm.  Infant was uncoordinated with bottle and tongue thrusting and took 5 minutes to finally get suction and feed well.  She did feed with coordinated suck and swallow once she figured it out.      Plan made to work on baby sucking pacifier to organize her suck and then put her to breast with the shield.  After Meli will pump and bottle feed ebm until baby is breast feeding well at breast for a good 20-30 minutes with audible frequent swallowing.  Encouraged Meli to follow up with outpatient lactation and reviewed resources.  Suggested that they continue to have baby work on pacifier between feedings.      Meli was very appreciative of my time and visit.  She was feeling better about feeding and having a plan.  Reassured her that pumping is to be temporary until baby is more organized with feeding.  She has a Spectra pump for home use.   She plans to use formula if baby is needing more than ebm she is getting.     Meli had no other questions.     Saima Foreman  RN, IBCLC        "

## 2019-07-30 NOTE — PLAN OF CARE
Vitals signs stable. Fundus firm. Pain well controlled with Tylenol & Ibuprofen. Working on breastfeeding every 2-3 hours, lactation spent a good amount of time with the patient and she is feeling better about her plan in place for discharging today. Discharge instructions and follow up discussed. Questions and concerns addressed.

## 2019-07-30 NOTE — PLAN OF CARE
Vital signs stable. Postpartum assessment WDL. Pain controlled with Tylenol and Ibuprofen. Patient voiding without difficulty. Breastfeeding on cue with some assist. Pt started pumping due to baby being sleepy at breast and due to weight loss. Education given on how to use the breast pump and how to syringe feed baby at the breast. Patient and infant bonding well. Will continue with current plan of care.

## 2019-08-06 ENCOUNTER — TELEPHONE (OUTPATIENT)
Dept: PEDIATRICS | Facility: CLINIC | Age: 34
End: 2019-08-06

## 2019-08-06 DIAGNOSIS — Z91.89 AT RISK FOR INEFFECTIVE BREASTFEEDING: Primary | ICD-10-CM

## 2019-08-06 NOTE — TELEPHONE ENCOUNTER
ALESSANDRO sent to compounding pharmacy for sore nipples. [facility]ed to  [facility] [phone].      Georgette Johnson RN

## 2019-09-26 ENCOUNTER — DOCUMENTATION ONLY (OUTPATIENT)
Dept: CARE COORDINATION | Facility: CLINIC | Age: 34
End: 2019-09-26

## 2019-11-04 ENCOUNTER — HEALTH MAINTENANCE LETTER (OUTPATIENT)
Age: 34
End: 2019-11-04

## 2020-11-22 ENCOUNTER — HEALTH MAINTENANCE LETTER (OUTPATIENT)
Age: 35
End: 2020-11-22

## 2021-09-18 ENCOUNTER — HEALTH MAINTENANCE LETTER (OUTPATIENT)
Age: 36
End: 2021-09-18

## 2022-01-08 ENCOUNTER — HEALTH MAINTENANCE LETTER (OUTPATIENT)
Age: 37
End: 2022-01-08

## 2022-11-20 ENCOUNTER — HEALTH MAINTENANCE LETTER (OUTPATIENT)
Age: 37
End: 2022-11-20

## 2022-12-06 ENCOUNTER — LAB REQUISITION (OUTPATIENT)
Dept: LAB | Facility: CLINIC | Age: 37
End: 2022-12-06

## 2022-12-06 DIAGNOSIS — Z01.419 ENCOUNTER FOR GYNECOLOGICAL EXAMINATION (GENERAL) (ROUTINE) WITHOUT ABNORMAL FINDINGS: ICD-10-CM

## 2022-12-06 PROCEDURE — G0145 SCR C/V CYTO,THINLAYER,RESCR: HCPCS | Performed by: OBSTETRICS & GYNECOLOGY

## 2022-12-09 LAB
BKR LAB AP GYN ADEQUACY: NORMAL
BKR LAB AP GYN INTERPRETATION: NORMAL
BKR LAB AP HPV REFLEX: NORMAL
BKR LAB AP LMP: NORMAL
BKR LAB AP PREVIOUS ABNL DX: NORMAL
BKR LAB AP PREVIOUS ABNORMAL: NORMAL
PATH REPORT.COMMENTS IMP SPEC: NORMAL
PATH REPORT.COMMENTS IMP SPEC: NORMAL
PATH REPORT.RELEVANT HX SPEC: NORMAL

## 2023-06-02 ENCOUNTER — HEALTH MAINTENANCE LETTER (OUTPATIENT)
Age: 38
End: 2023-06-02

## 2023-12-07 ENCOUNTER — LAB REQUISITION (OUTPATIENT)
Dept: LAB | Facility: CLINIC | Age: 38
End: 2023-12-07

## 2023-12-07 DIAGNOSIS — Z01.419 ENCOUNTER FOR GYNECOLOGICAL EXAMINATION (GENERAL) (ROUTINE) WITHOUT ABNORMAL FINDINGS: ICD-10-CM

## 2023-12-07 PROCEDURE — G0145 SCR C/V CYTO,THINLAYER,RESCR: HCPCS | Performed by: OBSTETRICS & GYNECOLOGY

## 2024-11-12 NOTE — PROGRESS NOTES
[facility] will be sharing updates with you on Maternal Child Health Referral requests for home care services.  This is for care coordination purposes and alert you to referral status.  We received the referral for  Meli Pringle; MRN [id number] and want to update you:      [facility] has made two attempts to contact patient by phone and text message over the last four days.  We have not had any response from patient.  Final message was left advising patient to follow up with Primary Care Providers for mom and baby.  Ordering MD and Primary Care Providers for mom and baby notified.    Sincerely [facility]  William Koehler  [phone]       
Tension headache